# Patient Record
Sex: MALE | Race: BLACK OR AFRICAN AMERICAN | NOT HISPANIC OR LATINO | Employment: FULL TIME | URBAN - METROPOLITAN AREA
[De-identification: names, ages, dates, MRNs, and addresses within clinical notes are randomized per-mention and may not be internally consistent; named-entity substitution may affect disease eponyms.]

---

## 2020-12-17 ENCOUNTER — HOSPITAL ENCOUNTER (EMERGENCY)
Facility: HOSPITAL | Age: 50
Discharge: HOME | End: 2020-12-17
Attending: EMERGENCY MEDICINE
Payer: COMMERCIAL

## 2020-12-17 VITALS
RESPIRATION RATE: 19 BRPM | HEIGHT: 73 IN | TEMPERATURE: 99 F | DIASTOLIC BLOOD PRESSURE: 68 MMHG | BODY MASS INDEX: 37.77 KG/M2 | HEART RATE: 84 BPM | SYSTOLIC BLOOD PRESSURE: 128 MMHG | OXYGEN SATURATION: 97 % | WEIGHT: 285 LBS

## 2020-12-17 DIAGNOSIS — S83.91XA SPRAIN OF RIGHT KNEE, UNSPECIFIED LIGAMENT, INITIAL ENCOUNTER: Primary | ICD-10-CM

## 2020-12-17 DIAGNOSIS — T14.90XA TRAUMA: ICD-10-CM

## 2020-12-17 PROCEDURE — 73562 X-RAY EXAM OF KNEE 3: CPT | Mod: TC,FY,RT

## 2020-12-17 PROCEDURE — 73562 XR KNEE 3 VIEW RIGHT: ICD-10-PCS | Mod: 26,RT,, | Performed by: RADIOLOGY

## 2020-12-17 PROCEDURE — 99283 EMERGENCY DEPT VISIT LOW MDM: CPT | Mod: 25

## 2020-12-17 PROCEDURE — 29505 APPLICATION LONG LEG SPLINT: CPT | Mod: RT

## 2020-12-17 PROCEDURE — 73562 X-RAY EXAM OF KNEE 3: CPT | Mod: 26,RT,, | Performed by: RADIOLOGY

## 2020-12-17 RX ORDER — KETOROLAC TROMETHAMINE 10 MG/1
10 TABLET, FILM COATED ORAL EVERY 6 HOURS PRN
Qty: 20 TABLET | Refills: 0 | Status: SHIPPED | OUTPATIENT
Start: 2020-12-17

## 2020-12-17 RX ORDER — OMEPRAZOLE 40 MG/1
40 CAPSULE, DELAYED RELEASE ORAL DAILY
COMMUNITY

## 2020-12-17 RX ORDER — AZATHIOPRINE 50 MG/1
50 TABLET ORAL DAILY
COMMUNITY

## 2020-12-17 RX ORDER — NAPROXEN SODIUM 220 MG/1
81 TABLET, FILM COATED ORAL DAILY
COMMUNITY

## 2020-12-17 RX ORDER — NIFEDIPINE 30 MG/1
30 TABLET, FILM COATED, EXTENDED RELEASE ORAL DAILY
COMMUNITY

## 2020-12-17 RX ORDER — ROSUVASTATIN CALCIUM 5 MG/1
5 TABLET, COATED ORAL DAILY
COMMUNITY

## 2020-12-17 RX ORDER — DULOXETIN HYDROCHLORIDE 60 MG/1
60 CAPSULE, DELAYED RELEASE ORAL DAILY
COMMUNITY

## 2020-12-17 NOTE — ED PROVIDER NOTES
Encounter Date: 12/17/2020       History     Chief Complaint   Patient presents with    Fall     Patient reports he was working yesterday and a board broke causing patient to fall approx 2 feet onto his right leg. Patient c\o pain to the anterior right knee. Patient presents to triage in a wheelchair with knee brace in place.     Knee Pain     Patient comes in for evaluation of injury to the right knee.  Patient injured his knee yesterday.  Patient fell a very short distance and landed on his feet.  Since then he has had pain and swelling to the knee.  The pain is making it difficult to walk.  He notes sharp pain that is constant and worse with weight-bearing.        Review of patient's allergies indicates:   Allergen Reactions    Latex, natural rubber     Methotrexate analogues      Past Medical History:   Diagnosis Date    Coronary artery disease     Depression     GERD (gastroesophageal reflux disease)     Lupus     Raynaud disease      Past Surgical History:   Procedure Laterality Date    ABLATION      SINUS SURGERY       History reviewed. No pertinent family history.  Social History     Tobacco Use    Smoking status: Never Smoker    Smokeless tobacco: Never Used   Substance Use Topics    Alcohol use: Yes     Comment: socially    Drug use: Never     Review of Systems   Constitutional: Negative for fever.   HENT: Negative for sore throat.    Respiratory: Negative for shortness of breath.    Cardiovascular: Negative for chest pain.   Gastrointestinal: Negative for diarrhea, nausea and vomiting.       Physical Exam     Initial Vitals [12/17/20 0850]   BP Pulse Resp Temp SpO2   128/68 84 19 99.1 °F (37.3 °C) 97 %      MAP       --         Physical Exam    Constitutional: He appears well-developed and well-nourished. No distress.   HENT:   Head: Normocephalic and atraumatic.   Mouth/Throat: Oropharynx is clear and moist.   Neck: Normal range of motion.   Musculoskeletal:      Comments: Patient has  moderate edema involving the right knee.  Range of motion is limited secondary to pain.  Distal pulses are intact.  No laxity is appreciated.   Neurological: He is alert and oriented to person, place, and time. No cranial nerve deficit or sensory deficit.   Skin: Skin is warm and dry.         ED Course   Procedures  Labs Reviewed - No data to display       Imaging Results          X-Ray Knee 3 View Right (Final result)  Result time 12/17/20 09:45:14    Final result by Diana Cortez MD (12/17/20 09:45:14)                 Impression:      Large joint effusion without acute bony abnormality.      Electronically signed by: Diana Cortez  Date:    12/17/2020  Time:    09:45             Narrative:    EXAMINATION:  XR KNEE 3 VIEW RIGHT    CLINICAL HISTORY:  Injury, unspecified, initial encounter    TECHNIQUE:  AP, lateral, and Merchant views of the right knee were performed.    COMPARISON:  None    FINDINGS:  Three views of the right knee demonstrate a large knee joint effusion.  There is no fracture or dislocation.  Minimal degenerative changes medial and patellofemoral compartments.                                 Medical Decision Making:   ED Management:  Patient is here with injury to the right knee.  Given the large effusion noted on x-ray, I am suspicious of an injury to the cartilage.  However, I explained the patient that that is not 100% proven.  Recommend ice packs and nonweightbearing.  Out of work until Monday.  Follow-up with primary care when he gets back to his home state of [place]; he will be leaving over the weekend.  Return to the ER for worsening symptoms.                             Clinical Impression:       ICD-10-CM ICD-9-CM   1. Sprain of right knee, unspecified ligament, initial encounter  S83.91XA 844.9   2. Trauma  T14.90XA 959.9                          ED Disposition Condition    Discharge Stable        ED Prescriptions     Medication Sig Dispense Start Date End Date Auth. Provider     ketorolac (TORADOL) 10 mg tablet Take 1 tablet (10 mg total) by mouth every 6 (six) hours as needed for Pain. 20 tablet 12/17/2020  Carlos Manuel Patel MD        Follow-up Information     Follow up With Specialties Details Why Contact Info    Your physician        [facility] - ED Emergency Medicine  If symptoms worsen [address] [facility] [id number] [phone]                                       Carlos Manuel Patel MD  12/17/20 1021